# Patient Record
Sex: FEMALE | Race: BLACK OR AFRICAN AMERICAN | NOT HISPANIC OR LATINO | Employment: OTHER | URBAN - METROPOLITAN AREA
[De-identification: names, ages, dates, MRNs, and addresses within clinical notes are randomized per-mention and may not be internally consistent; named-entity substitution may affect disease eponyms.]

---

## 2020-05-01 ENCOUNTER — HOSPITAL ENCOUNTER (EMERGENCY)
Facility: HOSPITAL | Age: 75
Discharge: HOME | End: 2020-05-02
Attending: FAMILY MEDICINE
Payer: MEDICARE

## 2020-05-01 DIAGNOSIS — N30.00 ACUTE CYSTITIS WITHOUT HEMATURIA: Primary | ICD-10-CM

## 2020-05-01 DIAGNOSIS — R10.9 FLANK PAIN: ICD-10-CM

## 2020-05-01 DIAGNOSIS — K59.00 CONSTIPATION, UNSPECIFIED CONSTIPATION TYPE: ICD-10-CM

## 2020-05-01 DIAGNOSIS — K52.9 ENTERITIS: ICD-10-CM

## 2020-05-01 DIAGNOSIS — R93.2 ABNORMAL FINDINGS ON DIAGNOSTIC IMAGING OF GALL BLADDER: ICD-10-CM

## 2020-05-01 LAB
ALBUMIN SERPL BCP-MCNC: 3.7 G/DL (ref 3.5–5.2)
ALP SERPL-CCNC: 84 U/L (ref 55–135)
ALT SERPL W/O P-5'-P-CCNC: 15 U/L (ref 10–44)
ANION GAP SERPL CALC-SCNC: 5 MMOL/L (ref 8–16)
AST SERPL-CCNC: 18 U/L (ref 10–40)
BASOPHILS # BLD AUTO: 0.01 K/UL (ref 0–0.2)
BASOPHILS NFR BLD: 0.2 % (ref 0–1.9)
BILIRUB SERPL-MCNC: 0.2 MG/DL (ref 0.1–1)
BUN SERPL-MCNC: 10 MG/DL (ref 8–23)
CALCIUM SERPL-MCNC: 9 MG/DL (ref 8.7–10.5)
CHLORIDE SERPL-SCNC: 106 MMOL/L (ref 95–110)
CO2 SERPL-SCNC: 31 MMOL/L (ref 23–29)
CREAT SERPL-MCNC: 0.9 MG/DL (ref 0.5–1.4)
DIFFERENTIAL METHOD: ABNORMAL
EOSINOPHIL # BLD AUTO: 0 K/UL (ref 0–0.5)
EOSINOPHIL NFR BLD: 0.9 % (ref 0–8)
ERYTHROCYTE [DISTWIDTH] IN BLOOD BY AUTOMATED COUNT: 13.6 % (ref 11.5–14.5)
EST. GFR  (AFRICAN AMERICAN): >60 ML/MIN/1.73 M^2
EST. GFR  (NON AFRICAN AMERICAN): >60 ML/MIN/1.73 M^2
GLUCOSE SERPL-MCNC: 87 MG/DL (ref 70–110)
HCT VFR BLD AUTO: 36 % (ref 37–48.5)
HGB BLD-MCNC: 11.3 G/DL (ref 12–16)
IMM GRANULOCYTES # BLD AUTO: 0.01 K/UL (ref 0–0.04)
IMM GRANULOCYTES NFR BLD AUTO: 0.2 % (ref 0–0.5)
LIPASE SERPL-CCNC: 464 U/L (ref 4–60)
LYMPHOCYTES # BLD AUTO: 1.7 K/UL (ref 1–4.8)
LYMPHOCYTES NFR BLD: 37.4 % (ref 18–48)
MCH RBC QN AUTO: 29 PG (ref 27–31)
MCHC RBC AUTO-ENTMCNC: 31.4 G/DL (ref 32–36)
MCV RBC AUTO: 92 FL (ref 82–98)
MONOCYTES # BLD AUTO: 0.5 K/UL (ref 0.3–1)
MONOCYTES NFR BLD: 11 % (ref 4–15)
NEUTROPHILS # BLD AUTO: 2.2 K/UL (ref 1.8–7.7)
NEUTROPHILS NFR BLD: 50.3 % (ref 38–73)
NRBC BLD-RTO: 0 /100 WBC
PLATELET # BLD AUTO: 183 K/UL (ref 150–350)
PMV BLD AUTO: 11.2 FL (ref 9.2–12.9)
POTASSIUM SERPL-SCNC: 3.5 MMOL/L (ref 3.5–5.1)
PROT SERPL-MCNC: 7.3 G/DL (ref 6–8.4)
RBC # BLD AUTO: 3.9 M/UL (ref 4–5.4)
SODIUM SERPL-SCNC: 142 MMOL/L (ref 136–145)
WBC # BLD AUTO: 4.46 K/UL (ref 3.9–12.7)

## 2020-05-01 PROCEDURE — 99285 EMERGENCY DEPT VISIT HI MDM: CPT | Mod: 25,ER

## 2020-05-01 PROCEDURE — 96374 THER/PROPH/DIAG INJ IV PUSH: CPT | Mod: ER

## 2020-05-01 PROCEDURE — 63600175 PHARM REV CODE 636 W HCPCS: Mod: ER | Performed by: FAMILY MEDICINE

## 2020-05-01 PROCEDURE — 25000003 PHARM REV CODE 250: Mod: ER | Performed by: FAMILY MEDICINE

## 2020-05-01 PROCEDURE — 96375 TX/PRO/DX INJ NEW DRUG ADDON: CPT | Mod: ER

## 2020-05-01 PROCEDURE — 96361 HYDRATE IV INFUSION ADD-ON: CPT | Mod: ER

## 2020-05-01 PROCEDURE — 83690 ASSAY OF LIPASE: CPT | Mod: ER

## 2020-05-01 PROCEDURE — 80053 COMPREHEN METABOLIC PANEL: CPT | Mod: ER

## 2020-05-01 PROCEDURE — 85025 COMPLETE CBC W/AUTO DIFF WBC: CPT | Mod: ER

## 2020-05-01 RX ORDER — MORPHINE SULFATE 4 MG/ML
4 INJECTION, SOLUTION INTRAMUSCULAR; INTRAVENOUS
Status: COMPLETED | OUTPATIENT
Start: 2020-05-01 | End: 2020-05-01

## 2020-05-01 RX ORDER — KETOROLAC TROMETHAMINE 30 MG/ML
15 INJECTION, SOLUTION INTRAMUSCULAR; INTRAVENOUS
Status: DISCONTINUED | OUTPATIENT
Start: 2020-05-01 | End: 2020-05-01

## 2020-05-01 RX ADMIN — SODIUM CHLORIDE 1000 ML: 0.9 INJECTION, SOLUTION INTRAVENOUS at 10:05

## 2020-05-01 RX ADMIN — MORPHINE SULFATE 4 MG: 4 INJECTION INTRAVENOUS at 10:05

## 2020-05-02 VITALS
SYSTOLIC BLOOD PRESSURE: 148 MMHG | DIASTOLIC BLOOD PRESSURE: 68 MMHG | OXYGEN SATURATION: 100 % | WEIGHT: 156.5 LBS | RESPIRATION RATE: 20 BRPM | HEART RATE: 51 BPM | TEMPERATURE: 98 F

## 2020-05-02 LAB
BACTERIA #/AREA URNS AUTO: ABNORMAL /HPF
BILIRUB UR QL STRIP: NEGATIVE
CLARITY UR REFRACT.AUTO: CLEAR
COLOR UR AUTO: YELLOW
GLUCOSE UR QL STRIP: NEGATIVE
HGB UR QL STRIP: NEGATIVE
KETONES UR QL STRIP: NEGATIVE
LEUKOCYTE ESTERASE UR QL STRIP: NEGATIVE
MICROSCOPIC COMMENT: ABNORMAL
NITRITE UR QL STRIP: POSITIVE
PH UR STRIP: 7 [PH] (ref 5–8)
PROT UR QL STRIP: NEGATIVE
RBC #/AREA URNS AUTO: 1 /HPF (ref 0–4)
SP GR UR STRIP: 1.01 (ref 1–1.03)
SQUAMOUS #/AREA URNS AUTO: 1 /HPF
URN SPEC COLLECT METH UR: ABNORMAL
UROBILINOGEN UR STRIP-ACNC: NEGATIVE EU/DL
WBC #/AREA URNS AUTO: 2 /HPF (ref 0–5)

## 2020-05-02 PROCEDURE — 81000 URINALYSIS NONAUTO W/SCOPE: CPT | Mod: ER

## 2020-05-02 PROCEDURE — 63600175 PHARM REV CODE 636 W HCPCS: Mod: ER | Performed by: FAMILY MEDICINE

## 2020-05-02 RX ORDER — CEPHALEXIN 500 MG/1
500 CAPSULE ORAL EVERY 12 HOURS
Qty: 10 CAPSULE | Refills: 0 | Status: SHIPPED | OUTPATIENT
Start: 2020-05-02 | End: 2020-05-07

## 2020-05-02 RX ADMIN — CEFTRIAXONE 1 G: 1 INJECTION, SOLUTION INTRAVENOUS at 12:05

## 2020-05-02 NOTE — DISCHARGE INSTRUCTIONS
Please follow up with her primary care physician.  I recommend that you have further follow-up/imaging in regards to your gallbladder.  You may need a biopsy to rule out cancer.  Take Tylenol or ibuprofen for pain.  Drink lots of fluids. Take antibiotics as prescribed and until completion.     For URINARY TRACT INFECTION, I instructed patient to avoid holding in urine and recommended that she urinate when she feels the urge.  Also advised patient to drink plenty of liquids and reminded patient that she may need to drink more fluids than usual to help flush out the bacteria. Instructed patient to avoid alcohol, caffeine, and citrus juices as these substances can irritate your bladder and increase your symptoms.  Also recommended that patient apply heat to lower abdomen for 20 to 30 minutes every two hours to help decrease discomfort and pressure in the bladder.      Regarding CONSTIPATION, I informed patient of common causes of constipation (low-fiber diet, lack of physical activity, decreased fluid intake, and delays in going to the bathroom when urge is present) and ways to prevent it (eat lots of fiber, drink plenty of fluids daily, exercise regularly, and go to the bathroom when you urge presents.  For treatment, advised patient to use OTC medications:  stool softeners (docusate sodium), bulk laxatives (psyllium) to help add fluid and bulk to the stool, suppositories or gentle laxatives (milk of magnesia liquid), and to reserve enemas or stimulant laxatives for severe cases only. Reiterated the importance of following up with primary care provider for management of their constipation.    Discussed the signs and symptoms of gastroenteritis with patient including: abdominal pain; nausea, vomiting, and diarrhea; chills; clammy skin; excessive sweating; fever; joint stiffness; fecal incontinence; muscle pain; and weight loss. Advised patient to drink water or sports beverages such as Gatorade for electrolyte  replacement; do NOT use fruit juice (including apple juice), sodas or cola (flat or bubbly), Jell-O, or broth due to high sugar content; drink small amounts of fluid (2-4 oz.) every 30-60 minutes; and eat small amounts of food, when tolerable such as cereals, bread, potatoes, apples, bananas, and vegetables.  I also instructed on disease transmission and need for frequent hand washing.

## 2020-05-02 NOTE — ED PROVIDER NOTES
Encounter Date: 5/1/2020       History     Chief Complaint   Patient presents with    Flank Pain     left flank pain that started today.     This is a 74-year-old Afro-American female with past medical history of hypertension, hyperlipidemia, hypothyroidism who presents to the emergency department for left flank pain.  Patient states that this started a few hours prior to arrival.  Denies any trauma.  Denies any urinary symptoms including hematuria or dysuria.  States that the pain is a dull sharp sensation along the left flank.  Denies any similar previous episodes.  Reports last bowel movement was approximately 2-3 days ago which is her norm.  Denies any diarrhea.  Denies any nausea or vomiting.  Denies any fevers chills.  Denies taking anything for her pain either.  Denies any exacerbating or relieving factors.  Denies history of kidney stones in the past.  Denies any chest pain shortness of breath.        Review of patient's allergies indicates:  No Known Allergies  Past Medical History:   Diagnosis Date    Hyperlipidemia     Hypertension      History reviewed. No pertinent surgical history.  History reviewed. No pertinent family history.  Social History     Tobacco Use    Smoking status: Former Smoker   Substance Use Topics    Alcohol use: Not Currently    Drug use: Never     Review of Systems   Constitutional: Negative for chills, diaphoresis and fever.   HENT: Negative for congestion, postnasal drip, rhinorrhea, sneezing and sore throat.    Eyes: Negative for visual disturbance.   Respiratory: Negative for cough, chest tightness and shortness of breath.    Cardiovascular: Negative for chest pain, palpitations and leg swelling.   Gastrointestinal: Positive for constipation. Negative for abdominal pain, diarrhea, nausea and vomiting.   Genitourinary: Positive for flank pain. Negative for dysuria, frequency, hematuria and urgency.   Musculoskeletal: Negative for back pain, gait problem and myalgias.    Skin: Negative for rash.   Neurological: Negative for weakness, light-headedness, numbness and headaches.   Hematological: Does not bruise/bleed easily.   All other systems reviewed and are negative.      Physical Exam     Initial Vitals [05/01/20 2202]   BP Pulse Resp Temp SpO2   (!) 192/94 60 20 98.3 °F (36.8 °C) 100 %      MAP       --         Vitals:    05/01/20 2202 05/01/20 2317   BP: (!) 192/94 (!) 145/67   Pulse: 60 (!) 50   Resp: 20 20   Temp: 98.3 °F (36.8 °C)    TempSrc: Oral    SpO2: 100% 100%   Weight: 71 kg (156 lb 8.4 oz)        Physical Exam    Nursing note and vitals reviewed.  Constitutional: She appears well-developed and well-nourished. She is not diaphoretic. She appears distressed.   HENT:   Head: Normocephalic.   Right Ear: External ear normal.   Left Ear: External ear normal.   Mouth/Throat: Oropharynx is clear and moist.   Eyes: Conjunctivae and EOM are normal. Pupils are equal, round, and reactive to light.   Neck: Normal range of motion. Neck supple. No JVD present.   Cardiovascular: Normal rate, regular rhythm and normal heart sounds.   Pulmonary/Chest: Breath sounds normal. No respiratory distress. She has no wheezes.   Abdominal: Soft. Bowel sounds are normal. She exhibits no distension. There is no tenderness. There is no rebound and no guarding.   Genitourinary:   Genitourinary Comments: Deferred vaginal exam  Tenderness palpation along the left flank however there is no CVA tenderness nor suprapubic   Musculoskeletal: Normal range of motion. She exhibits no edema or tenderness.   Neurological: She is alert and oriented to person, place, and time. She has normal strength. No cranial nerve deficit or sensory deficit.   Skin: Skin is warm and dry. Capillary refill takes less than 2 seconds. No rash noted.   Psychiatric: She has a normal mood and affect. Thought content normal.         ED Course   Procedures  Labs Reviewed   CBC W/ AUTO DIFFERENTIAL - Abnormal; Notable for the  following components:       Result Value    RBC 3.90 (*)     Hemoglobin 11.3 (*)     Hematocrit 36.0 (*)     Mean Corpuscular Hemoglobin Conc 31.4 (*)     All other components within normal limits   COMPREHENSIVE METABOLIC PANEL - Abnormal; Notable for the following components:    CO2 31 (*)     Anion Gap 5 (*)     All other components within normal limits   URINALYSIS, REFLEX TO URINE CULTURE - Abnormal; Notable for the following components:    Nitrite, UA Positive (*)     All other components within normal limits    Narrative:     Preferred Collection Type->Urine, Clean Catch   LIPASE - Abnormal; Notable for the following components:    Lipase 464 (*)     All other components within normal limits   URINALYSIS MICROSCOPIC - Abnormal; Notable for the following components:    Bacteria Many (*)     All other components within normal limits    Narrative:     Preferred Collection Type->Urine, Clean Catch         Results for orders placed or performed during the hospital encounter of 05/01/20   CBC auto differential   Result Value Ref Range    WBC 4.46 3.90 - 12.70 K/uL    RBC 3.90 (L) 4.00 - 5.40 M/uL    Hemoglobin 11.3 (L) 12.0 - 16.0 g/dL    Hematocrit 36.0 (L) 37.0 - 48.5 %    Mean Corpuscular Volume 92 82 - 98 fL    Mean Corpuscular Hemoglobin 29.0 27.0 - 31.0 pg    Mean Corpuscular Hemoglobin Conc 31.4 (L) 32.0 - 36.0 g/dL    RDW 13.6 11.5 - 14.5 %    Platelets 183 150 - 350 K/uL    MPV 11.2 9.2 - 12.9 fL    Immature Granulocytes 0.2 0.0 - 0.5 %    Gran # (ANC) 2.2 1.8 - 7.7 K/uL    Immature Grans (Abs) 0.01 0.00 - 0.04 K/uL    Lymph # 1.7 1.0 - 4.8 K/uL    Mono # 0.5 0.3 - 1.0 K/uL    Eos # 0.0 0.0 - 0.5 K/uL    Baso # 0.01 0.00 - 0.20 K/uL    nRBC 0 0 /100 WBC    Gran% 50.3 38.0 - 73.0 %    Lymph% 37.4 18.0 - 48.0 %    Mono% 11.0 4.0 - 15.0 %    Eosinophil% 0.9 0.0 - 8.0 %    Basophil% 0.2 0.0 - 1.9 %    Differential Method Automated    Comprehensive metabolic panel   Result Value Ref Range    Sodium 142 136 -  145 mmol/L    Potassium 3.5 3.5 - 5.1 mmol/L    Chloride 106 95 - 110 mmol/L    CO2 31 (H) 23 - 29 mmol/L    Glucose 87 70 - 110 mg/dL    BUN, Bld 10 8 - 23 mg/dL    Creatinine 0.9 0.5 - 1.4 mg/dL    Calcium 9.0 8.7 - 10.5 mg/dL    Total Protein 7.3 6.0 - 8.4 g/dL    Albumin 3.7 3.5 - 5.2 g/dL    Total Bilirubin 0.2 0.1 - 1.0 mg/dL    Alkaline Phosphatase 84 55 - 135 U/L    AST 18 10 - 40 U/L    ALT 15 10 - 44 U/L    Anion Gap 5 (L) 8 - 16 mmol/L    eGFR if African American >60.0 >60 mL/min/1.73 m^2    eGFR if non African American >60.0 >60 mL/min/1.73 m^2   Urinalysis, Reflex to Urine Culture Urine, Clean Catch   Result Value Ref Range    Specimen UA Urine, Clean Catch     Color, UA Yellow Yellow, Straw, Kindra    Appearance, UA Clear Clear    pH, UA 7.0 5.0 - 8.0    Specific Gravity, UA 1.015 1.005 - 1.030    Protein, UA Negative Negative    Glucose, UA Negative Negative    Ketones, UA Negative Negative    Bilirubin (UA) Negative Negative    Occult Blood UA Negative Negative    Nitrite, UA Positive (A) Negative    Urobilinogen, UA Negative <2.0 EU/dL    Leukocytes, UA Negative Negative   Lipase   Result Value Ref Range    Lipase 464 (H) 4 - 60 U/L   Urinalysis Microscopic   Result Value Ref Range    RBC, UA 1 0 - 4 /hpf    WBC, UA 2 0 - 5 /hpf    Bacteria Many (A) None-Occ /hpf    Squam Epithel, UA 1 /hpf    Microscopic Comment SEE COMMENT          Imaging Results          CT Renal Stone Study ABD Pelvis WO (Final result)  Result time 05/01/20 23:02:08    Final result by Reuben Mcintosh III, MD (05/01/20 23:02:08)                 Impression:      1.  At least partially calcified gallbladder wall.  This can be seen in association with gallbladder neoplasm.  Follow-up/further workup suggested.    2.  Questionable thickening of the wall of some of the small bowel loops although this is not well seen due to lack of oral contrast.  Cannot exclude an element of enteritis.  No obstruction.    3.  Otherwise as above.   No kidney stones or obstruction.    All CT scans at this facility are performed  using dose modulation techniques as appropriate to performed exam including the following:  automated exposure control; adjustment of mA and/or kV according to the patients size (this includes techniques or standardized protocols for targeted exams where dose is matched to indication/reason for exam: i.e. extremities or head);  iterative reconstruction technique.      Electronically signed by: Reuben Mcintosh MD  Date:    05/01/2020  Time:    23:02             Narrative:    EXAMINATION:  CT RENAL STONE STUDY ABD PELVIS WO    CLINICAL HISTORY:  Flank pain, stone disease suspected;    TECHNIQUE:  Axial CT images performed through the abdomen and pelvis without intravenous contrast. Multiplanar reformats were performed and interpreted.    COMPARISON:  None    FINDINGS:  The heart size is normal.  Lung bases show no acute abnormality.  There is no free intraperitoneal air.  No bowel obstruction.  Bony windows show degenerative changes but no gross acute abnormality.    The liver and spleen are unremarkable for a non contrasted scan.  Suspect a small hiatal hernia.  Gallbladder shows calcification along the wall.  Please correlate.    There is no adrenal or pancreatic mass or enlargement.  Kidneys show no solid mass or obstruction.  Moderate volume of stool noted in the colon.  Bladder is unremarkable.  No abnormality in the right lower quadrant that would suggest acute appendicitis.    There is some loops of small bowel that show mild wall thickening but this is difficult to evaluate due to the lack of oral contrast.                                                   ED Course as of May 02 0024   Fri May 01, 2020   2320 Re-evaluation:  Patient states that morphine has helped.  She is resting comfortably in bed with pain improved.    [HAILEY]   2321 Have discussed results with patient.  At this time I have discussed the incidental finding of  her gallbladder being calcified and a differential diagnosis of potential malignancy.  I recommended that she have this followed up with her primary care physician for further imaging and evaluation.  Clinically her pain is not in the right upper quadrant but in the left flank therefore I do not believe this is attributing to her clinical presentation.  Patient does not drink alcohol nor do I feel that this is pancreatitis either.  There is potential enteritis and constipation.    [HAILEY]      ED Course User Index  [HAILEY] Nora Gonzalez MD          Medications   cefTRIAXone (ROCEPHIN) 1 g/50 mL D5W IVPB (has no administration in time range)   sodium chloride 0.9% bolus 1,000 mL (1,000 mLs Intravenous New Bag 5/1/20 2258)   morphine injection 4 mg (4 mg Intravenous Given 5/1/20 2258)           Clinical Impression:       ICD-10-CM ICD-9-CM   1. Acute cystitis without hematuria N30.00 595.0   2. Flank pain R10.9 789.09   3. Abnormal findings on diagnostic imaging of gall bladder R93.2 793.3   4. Enteritis K52.9 558.9   5. Constipation, unspecified constipation type K59.00 564.00       12:22 AM RE-EVALUATION & DISPOSITION:   Reassessment at the time of disposition demonstrates that the patient is resting comfortably in no acute distress.  She has remained hemodynamically stable throughout the entire ED visit and is without objective evidence for acute process requiring urgent intervention or hospitalization. I discussed test results and provided counseling to patient with regard to condition, the treatment plan, specific conditions for return, and the importance of follow up.  Answered questions at this time. The patient is stable for discharge.     Please follow up with her primary care physician.  I recommend that you have further follow-up/imaging in regards to your gallbladder.  You may need a biopsy to rule out cancer.  Take Tylenol or ibuprofen for pain.  Drink lots of fluids. Take antibiotics as prescribed and until  completion.     For URINARY TRACT INFECTION, I instructed patient to avoid holding in urine and recommended that she urinate when she feels the urge.  Also advised patient to drink plenty of liquids and reminded patient that she may need to drink more fluids than usual to help flush out the bacteria. Instructed patient to avoid alcohol, caffeine, and citrus juices as these substances can irritate your bladder and increase your symptoms.  Also recommended that patient apply heat to lower abdomen for 20 to 30 minutes every two hours to help decrease discomfort and pressure in the bladder.      Regarding CONSTIPATION, I informed patient of common causes of constipation (low-fiber diet, lack of physical activity, decreased fluid intake, and delays in going to the bathroom when urge is present) and ways to prevent it (eat lots of fiber, drink plenty of fluids daily, exercise regularly, and go to the bathroom when you urge presents.  For treatment, advised patient to use OTC medications:  stool softeners (docusate sodium), bulk laxatives (psyllium) to help add fluid and bulk to the stool, suppositories or gentle laxatives (milk of magnesia liquid), and to reserve enemas or stimulant laxatives for severe cases only. Reiterated the importance of following up with primary care provider for management of their constipation.    Discussed the signs and symptoms of gastroenteritis with patient including: abdominal pain; nausea, vomiting, and diarrhea; chills; clammy skin; excessive sweating; fever; joint stiffness; fecal incontinence; muscle pain; and weight loss. Advised patient to drink water or sports beverages such as Gatorade for electrolyte replacement; do NOT use fruit juice (including apple juice), sodas or cola (flat or bubbly), Jell-O, or broth due to high sugar content; drink small amounts of fluid (2-4 oz.) every 30-60 minutes; and eat small amounts of food, when tolerable such as cereals, bread, potatoes, apples,  "bananas, and vegetables.  I also instructed on disease transmission and need for frequent hand washing.            ED Disposition Condition    Discharge Stable        ED Prescriptions     Medication Sig Dispense Start Date End Date Auth. Provider    cephALEXin (KEFLEX) 500 MG capsule Take 1 capsule (500 mg total) by mouth every 12 (twelve) hours. for 5 days 10 capsule 5/2/2020 5/7/2020 Nora Gonzalez MD        Follow-up Information     Follow up With Specialties Details Why Contact Info    Willem Rodrigues MD Family Medicine Schedule an appointment as soon as possible for a visit in 3 days  [address] [facility] [address] [phone]                        Portions of this note may have been created with voice recognition software. Occasional "wrong-word" or "sound-a-like" substitutions may have occurred due to the inherent limitations of voice recognition software. Please, read the note carefully and recognize, using context, where substitutions have occurred.                    Nora Gonzalez MD  05/02/20 0024    "

## 2020-05-04 ENCOUNTER — HOSPITAL ENCOUNTER (EMERGENCY)
Facility: HOSPITAL | Age: 75
Discharge: HOME | End: 2020-05-04
Attending: EMERGENCY MEDICINE
Payer: MEDICARE

## 2020-05-04 VITALS
OXYGEN SATURATION: 99 % | WEIGHT: 153.31 LBS | RESPIRATION RATE: 20 BRPM | HEART RATE: 66 BPM | DIASTOLIC BLOOD PRESSURE: 69 MMHG | TEMPERATURE: 99 F | SYSTOLIC BLOOD PRESSURE: 146 MMHG

## 2020-05-04 DIAGNOSIS — R11.2 NAUSEA & VOMITING: Primary | ICD-10-CM

## 2020-05-04 DIAGNOSIS — R53.1 WEAKNESS: ICD-10-CM

## 2020-05-04 LAB
ALBUMIN SERPL BCP-MCNC: 3.9 G/DL (ref 3.5–5.2)
ALP SERPL-CCNC: 67 U/L (ref 55–135)
ALT SERPL W/O P-5'-P-CCNC: 13 U/L (ref 10–44)
ANION GAP SERPL CALC-SCNC: 10 MMOL/L (ref 8–16)
AST SERPL-CCNC: 21 U/L (ref 10–40)
BASOPHILS # BLD AUTO: 0.01 K/UL (ref 0–0.2)
BASOPHILS NFR BLD: 0.2 % (ref 0–1.9)
BILIRUB SERPL-MCNC: 0.4 MG/DL (ref 0.1–1)
BNP SERPL-MCNC: 233 PG/ML (ref 0–99)
BUN SERPL-MCNC: 8 MG/DL (ref 8–23)
CALCIUM SERPL-MCNC: 9.4 MG/DL (ref 8.7–10.5)
CHLORIDE SERPL-SCNC: 102 MMOL/L (ref 95–110)
CK SERPL-CCNC: 33 U/L (ref 20–180)
CO2 SERPL-SCNC: 27 MMOL/L (ref 23–29)
CREAT SERPL-MCNC: 0.9 MG/DL (ref 0.5–1.4)
CRP SERPL-MCNC: 3.2 MG/L (ref 0–8.2)
DIFFERENTIAL METHOD: ABNORMAL
EOSINOPHIL # BLD AUTO: 0 K/UL (ref 0–0.5)
EOSINOPHIL NFR BLD: 0.2 % (ref 0–8)
ERYTHROCYTE [DISTWIDTH] IN BLOOD BY AUTOMATED COUNT: 13.5 % (ref 11.5–14.5)
EST. GFR  (AFRICAN AMERICAN): >60 ML/MIN/1.73 M^2
EST. GFR  (NON AFRICAN AMERICAN): >60 ML/MIN/1.73 M^2
FERRITIN SERPL-MCNC: 315 NG/ML (ref 20–300)
GLUCOSE SERPL-MCNC: 112 MG/DL (ref 70–110)
HCT VFR BLD AUTO: 34.8 % (ref 37–48.5)
HCV AB SERPL QL IA: NEGATIVE
HGB BLD-MCNC: 10.9 G/DL (ref 12–16)
IMM GRANULOCYTES # BLD AUTO: 0.02 K/UL (ref 0–0.04)
IMM GRANULOCYTES NFR BLD AUTO: 0.4 % (ref 0–0.5)
LACTATE SERPL-SCNC: 1.3 MMOL/L (ref 0.5–2.2)
LDH SERPL L TO P-CCNC: 177 U/L (ref 110–260)
LYMPHOCYTES # BLD AUTO: 0.6 K/UL (ref 1–4.8)
LYMPHOCYTES NFR BLD: 10.2 % (ref 18–48)
MCH RBC QN AUTO: 28.5 PG (ref 27–31)
MCHC RBC AUTO-ENTMCNC: 31.3 G/DL (ref 32–36)
MCV RBC AUTO: 91 FL (ref 82–98)
MONOCYTES # BLD AUTO: 0.4 K/UL (ref 0.3–1)
MONOCYTES NFR BLD: 6.8 % (ref 4–15)
NEUTROPHILS # BLD AUTO: 4.5 K/UL (ref 1.8–7.7)
NEUTROPHILS NFR BLD: 82.2 % (ref 38–73)
NRBC BLD-RTO: 0 /100 WBC
PLATELET # BLD AUTO: 152 K/UL (ref 150–350)
PMV BLD AUTO: 11.2 FL (ref 9.2–12.9)
POTASSIUM SERPL-SCNC: 3.5 MMOL/L (ref 3.5–5.1)
PROCALCITONIN SERPL IA-MCNC: 0.03 NG/ML
PROT SERPL-MCNC: 7.2 G/DL (ref 6–8.4)
RBC # BLD AUTO: 3.83 M/UL (ref 4–5.4)
SARS-COV-2 RDRP RESP QL NAA+PROBE: NEGATIVE
SODIUM SERPL-SCNC: 139 MMOL/L (ref 136–145)
TROPONIN I SERPL DL<=0.01 NG/ML-MCNC: <0.006 NG/ML (ref 0–0.03)
WBC # BLD AUTO: 5.47 K/UL (ref 3.9–12.7)

## 2020-05-04 PROCEDURE — 85025 COMPLETE CBC W/AUTO DIFF WBC: CPT | Mod: ER

## 2020-05-04 PROCEDURE — U0002 COVID-19 LAB TEST NON-CDC: HCPCS | Mod: ER

## 2020-05-04 PROCEDURE — 96374 THER/PROPH/DIAG INJ IV PUSH: CPT | Mod: ER

## 2020-05-04 PROCEDURE — 84145 PROCALCITONIN (PCT): CPT | Mod: ER

## 2020-05-04 PROCEDURE — 82728 ASSAY OF FERRITIN: CPT

## 2020-05-04 PROCEDURE — 82550 ASSAY OF CK (CPK): CPT | Mod: ER

## 2020-05-04 PROCEDURE — 93005 ELECTROCARDIOGRAM TRACING: CPT | Mod: ER

## 2020-05-04 PROCEDURE — 84484 ASSAY OF TROPONIN QUANT: CPT | Mod: ER

## 2020-05-04 PROCEDURE — 99284 EMERGENCY DEPT VISIT MOD MDM: CPT | Mod: 25,ER

## 2020-05-04 PROCEDURE — 83880 ASSAY OF NATRIURETIC PEPTIDE: CPT | Mod: ER

## 2020-05-04 PROCEDURE — 93010 EKG 12-LEAD: ICD-10-PCS | Mod: ,,, | Performed by: INTERNAL MEDICINE

## 2020-05-04 PROCEDURE — 86140 C-REACTIVE PROTEIN: CPT | Mod: ER

## 2020-05-04 PROCEDURE — 83605 ASSAY OF LACTIC ACID: CPT | Mod: ER

## 2020-05-04 PROCEDURE — 25000003 PHARM REV CODE 250: Mod: ER | Performed by: EMERGENCY MEDICINE

## 2020-05-04 PROCEDURE — 80053 COMPREHEN METABOLIC PANEL: CPT | Mod: ER

## 2020-05-04 PROCEDURE — 83615 LACTATE (LD) (LDH) ENZYME: CPT | Mod: ER

## 2020-05-04 PROCEDURE — 86803 HEPATITIS C AB TEST: CPT

## 2020-05-04 PROCEDURE — 93010 ELECTROCARDIOGRAM REPORT: CPT | Mod: ,,, | Performed by: INTERNAL MEDICINE

## 2020-05-04 PROCEDURE — 63600175 PHARM REV CODE 636 W HCPCS: Mod: ER | Performed by: EMERGENCY MEDICINE

## 2020-05-04 RX ORDER — ONDANSETRON 4 MG/1
4 TABLET, FILM COATED ORAL EVERY 8 HOURS PRN
Qty: 12 TABLET | Refills: 0 | Status: SHIPPED | OUTPATIENT
Start: 2020-05-04 | End: 2020-05-25

## 2020-05-04 RX ORDER — ONDANSETRON 2 MG/ML
4 INJECTION INTRAMUSCULAR; INTRAVENOUS
Status: COMPLETED | OUTPATIENT
Start: 2020-05-04 | End: 2020-05-04

## 2020-05-04 RX ADMIN — ONDANSETRON 4 MG: 2 INJECTION INTRAMUSCULAR; INTRAVENOUS at 10:05

## 2020-05-04 RX ADMIN — SODIUM CHLORIDE 500 ML: 0.9 INJECTION, SOLUTION INTRAVENOUS at 10:05

## 2020-05-04 NOTE — ED PROVIDER NOTES
Encounter Date: 5/4/2020       History     Chief Complaint   Patient presents with    Emesis     vomiting,dizziness and weakness     The history is provided by the patient.   Emesis    This is a new problem. The current episode started yesterday. The problem occurs every few hours. The problem has been unchanged. The emesis has an appearance of stomach contents. Pertinent negatives include no abdominal pain, no arthralgias, no chills, no cough, no diarrhea, no fever, no headaches, no myalgias, no sweats and no URI.     Review of patient's allergies indicates:  No Known Allergies  Past Medical History:   Diagnosis Date    Hyperlipidemia     Hypertension      No past surgical history on file.  No family history on file.  Social History     Tobacco Use    Smoking status: Former Smoker   Substance Use Topics    Alcohol use: Not Currently    Drug use: Never     Review of Systems   Constitutional: Negative for chills and fever.   HENT: Negative for congestion.    Respiratory: Negative for cough.    Cardiovascular: Negative for chest pain.   Gastrointestinal: Positive for vomiting. Negative for abdominal pain and diarrhea.   Musculoskeletal: Negative for arthralgias and myalgias.   Neurological: Positive for dizziness and weakness. Negative for headaches.   All other systems reviewed and are negative.      Physical Exam     Initial Vitals [05/04/20 0937]   BP Pulse Resp Temp SpO2   (!) 184/86 71 18 98.5 °F (36.9 °C) 97 %      MAP       --         Physical Exam    Nursing note and vitals reviewed.  Constitutional: She appears well-developed and well-nourished. No distress.   Elderly frail   HENT:   Head: Normocephalic and atraumatic.   Mouth/Throat: Oropharynx is clear and moist.   Eyes: Conjunctivae and EOM are normal. Pupils are equal, round, and reactive to light.   Neck: Normal range of motion. Neck supple.   Cardiovascular: Normal rate, regular rhythm and normal heart sounds.   Pulmonary/Chest: Breath sounds  normal. No respiratory distress.   Abdominal: Soft. Bowel sounds are normal. She exhibits no distension. There is no tenderness.   Musculoskeletal: Normal range of motion.   Neurological: She is alert and oriented to person, place, and time. She has normal strength.   Skin: Skin is warm and dry.   Psychiatric: She has a normal mood and affect. Thought content normal.         ED Course   Procedures  Labs Reviewed   CBC W/ AUTO DIFFERENTIAL - Abnormal; Notable for the following components:       Result Value    RBC 3.83 (*)     Hemoglobin 10.9 (*)     Hematocrit 34.8 (*)     Mean Corpuscular Hemoglobin Conc 31.3 (*)     Lymph # 0.6 (*)     Gran% 82.2 (*)     Lymph% 10.2 (*)     All other components within normal limits   COMPREHENSIVE METABOLIC PANEL - Abnormal; Notable for the following components:    Glucose 112 (*)     All other components within normal limits   B-TYPE NATRIURETIC PEPTIDE - Abnormal; Notable for the following components:     (*)     All other components within normal limits   C-REACTIVE PROTEIN   LACTATE DEHYDROGENASE   CK   LACTIC ACID, PLASMA   TROPONIN I   SARS-COV-2 RNA AMPLIFICATION, QUAL    Narrative:     What symptom criteria does the patient meet?->Muscle pain   PROCALCITONIN   HEPATITIS C ANTIBODY   FERRITIN     Results for orders placed or performed during the hospital encounter of 05/04/20   CBC auto differential   Result Value Ref Range    WBC 5.47 3.90 - 12.70 K/uL    RBC 3.83 (L) 4.00 - 5.40 M/uL    Hemoglobin 10.9 (L) 12.0 - 16.0 g/dL    Hematocrit 34.8 (L) 37.0 - 48.5 %    Mean Corpuscular Volume 91 82 - 98 fL    Mean Corpuscular Hemoglobin 28.5 27.0 - 31.0 pg    Mean Corpuscular Hemoglobin Conc 31.3 (L) 32.0 - 36.0 g/dL    RDW 13.5 11.5 - 14.5 %    Platelets 152 150 - 350 K/uL    MPV 11.2 9.2 - 12.9 fL    Immature Granulocytes 0.4 0.0 - 0.5 %    Gran # (ANC) 4.5 1.8 - 7.7 K/uL    Immature Grans (Abs) 0.02 0.00 - 0.04 K/uL    Lymph # 0.6 (L) 1.0 - 4.8 K/uL    Mono # 0.4 0.3 -  1.0 K/uL    Eos # 0.0 0.0 - 0.5 K/uL    Baso # 0.01 0.00 - 0.20 K/uL    nRBC 0 0 /100 WBC    Gran% 82.2 (H) 38.0 - 73.0 %    Lymph% 10.2 (L) 18.0 - 48.0 %    Mono% 6.8 4.0 - 15.0 %    Eosinophil% 0.2 0.0 - 8.0 %    Basophil% 0.2 0.0 - 1.9 %    Differential Method Automated    Comprehensive metabolic panel   Result Value Ref Range    Sodium 139 136 - 145 mmol/L    Potassium 3.5 3.5 - 5.1 mmol/L    Chloride 102 95 - 110 mmol/L    CO2 27 23 - 29 mmol/L    Glucose 112 (H) 70 - 110 mg/dL    BUN, Bld 8 8 - 23 mg/dL    Creatinine 0.9 0.5 - 1.4 mg/dL    Calcium 9.4 8.7 - 10.5 mg/dL    Total Protein 7.2 6.0 - 8.4 g/dL    Albumin 3.9 3.5 - 5.2 g/dL    Total Bilirubin 0.4 0.1 - 1.0 mg/dL    Alkaline Phosphatase 67 55 - 135 U/L    AST 21 10 - 40 U/L    ALT 13 10 - 44 U/L    Anion Gap 10 8 - 16 mmol/L    eGFR if African American >60.0 >60 mL/min/1.73 m^2    eGFR if non African American >60.0 >60 mL/min/1.73 m^2   C-Reactive Protein   Result Value Ref Range    CRP 3.2 0.0 - 8.2 mg/L   Lactate Dehydrogenase   Result Value Ref Range     110 - 260 U/L   CK   Result Value Ref Range    CPK 33 20 - 180 U/L   Lactic Acid, Plasma   Result Value Ref Range    Lactate (Lactic Acid) 1.3 0.5 - 2.2 mmol/L   Troponin I   Result Value Ref Range    Troponin I <0.006 0.000 - 0.026 ng/mL   COVID-19 Rapid Screening   Result Value Ref Range    SARS-CoV-2 RNA, Amplification, Qual Negative Negative   Procalcitonin   Result Value Ref Range    Procalcitonin 0.03 <0.25 ng/mL   Brain Natriuretic Peptide   Result Value Ref Range     (H) 0 - 99 pg/mL          ECG Results          EKG 12-lead (In process)  Result time 05/04/20 10:03:30    In process by Interface, Lab In [facility] (05/04/20 10:03:30)                 Narrative:    Test Reason : R53.1,    Vent. Rate : 053 BPM     Atrial Rate : 053 BPM     P-R Int : 154 ms          QRS Dur : 082 ms      QT Int : 462 ms       P-R-T Axes : 047 025 083 degrees     QTc Int : 433 ms    Sinus  bradycardia  Anterior infarct ,age undetermined  T wave abnormality, consider lateral ischemia  Abnormal ECG  No previous ECGs available    Referred By:  DARIO           Confirmed By:                             Imaging Results          US Abdomen Limited (Final result)  Result time 05/04/20 11:38:22    Final result by Guillermo Sun MD (05/04/20 11:38:22)                 Impression:      Diffuse shadowing of the gallbladder fossa concerning for calcification of the gallbladder wall as seen on recent CT versus numerous gallstones.  No sonographic evidence of acute cholecystitis.    Fluid collection in the splenic hilum, most likely reflects adjacent distended, fluid-filled stomach.  If there is concern for left upper quadrant pathology, CT scan could be obtained for further evaluation.      Electronically signed by: Guillermo Sun  Date:    05/04/2020  Time:    11:38             Narrative:    EXAMINATION:  US ABDOMEN LIMITED    CLINICAL HISTORY:  Vomiting;    TECHNIQUE:  Limited ultrasound of the right upper quadrant of the abdomen focused on the biliary system was performed.    COMPARISON:  CT renal stone 05/01/2020    FINDINGS:  Gallbladder: Demonstrates diffuse shadowing of the gallbladder fossa possibly reflecting numerous shadowing gallstones or calcification of the gallbladder wall is seen on recent CT.  Sonographic Bernal's sign is negative.    Biliary system: The common duct is not dilated, measuring 4 mm.  No intrahepatic ductal dilatation.    Pancreas: The visualized portions of pancreas appear normal.    Liver: Measures 13.9 cm.  No focal hepatic abnormality.  The main portal vein is patent.    Spleen: Spleen is partially visualized and measures up to 8.9 cm, nonenlarged.  There is a hypoechoic fluid collection adjacent to the splenic hilum, likely reflecting the fluid-filled stomach.    Miscellaneous: No ascites.                               X-Ray Abdomen Flat And Erect (Final result)  Result time  05/04/20 11:00:24    Final result by Guillermo Sun MD (05/04/20 11:00:24)                 Impression:      No acute radiographic abnormality in the abdomen.    Partially calcified gallbladder wall as seen on recent CT.      Electronically signed by: Guillermo Sun  Date:    05/04/2020  Time:    11:00             Narrative:    EXAMINATION:  XR ABDOMEN FLAT AND ERECT    CLINICAL HISTORY:  Nausea with vomiting, unspecified    TECHNIQUE:  Flat and erect AP views of the abdomen were performed.    COMPARISON:  CT renal stone 05/01/2020    FINDINGS:  Cardiac leads project over the abdomen.  Air-filled prominence of the stomach.  No dilated loops of small bowel or colon.  No radiographic evidence of obstruction.  Right upper quadrant calcification compatible with calcified gallbladder wall as seen on recent CT.  Visualized osseous structures appear intact.  No pathologic mass in the abdomen.  No renal calcification.                               X-Ray Chest AP Portable (Final result)  Result time 05/04/20 10:56:20    Final result by Jero Beauchamp MD (05/04/20 10:56:20)                 Impression:      No acute findings.      Electronically signed by: Jero Beauchamp MD  Date:    05/04/2020  Time:    10:56             Narrative:    EXAMINATION:  XR CHEST AP PORTABLE    CLINICAL HISTORY:  Suspected Covid-19 Virus Infection;    TECHNIQUE:  Single frontal view of the chest was performed.    COMPARISON:  None    FINDINGS:  The cardiomediastinal silhouette is normal.  Mild aortic atherosclerosis and tortuosity.    The lungs are clear.  No pleural effusions.    Bones are unremarkable.                            11:52 AM - Counseling: Spoke with the patient and discussed todays findings, in addition to providing specific details for the plan of care and counseling regarding the diagnosis and prognosis. Questions are answered at this time.     I discussed with patient and/or family/caretaker that evaluation in the ED does not  suggest any emergent or life threatening medical conditions requiring immediate intervention beyond what was provided in the ED, and I believe patient is safe for discharge.  Regardless, an unremarkable evaluation in the ED does not preclude the development or presence of a serious of life threatening condition. As such, patient was instructed to return immediately for any worsening or change in current symptoms.                                       Clinical Impression:       ICD-10-CM ICD-9-CM   1. Nausea & vomiting R11.2 787.01   2. Weakness R53.1 780.79         Disposition:   Disposition: Discharged  Condition: Stable     ED Disposition Condition    Discharge Stable        ED Prescriptions     Medication Sig Dispense Start Date End Date Auth. Provider    ondansetron (ZOFRAN) 4 MG tablet Take 1 tablet (4 mg total) by mouth every 8 (eight) hours as needed. 12 tablet 5/4/2020  Amadou Leary MD        Follow-up Information     Follow up With Specialties Details Why Contact Info    Willem Rodrigues MD Family Medicine Call in 1 day  [address]  [facility] [id number] [phone]      [facility] Emergency Medicine  If symptoms worsen [id number] [address] [id number] [phone]                                     Amadou Leary MD  05/04/20 [id number]

## 2020-05-09 PROBLEM — R93.2 ABNORMAL CT SCAN, GALLBLADDER: Status: ACTIVE | Noted: 2020-05-09

## 2020-05-09 PROBLEM — R73.09 ABNORMAL GLUCOSE: Status: ACTIVE | Noted: 2020-05-09

## 2020-05-09 PROBLEM — D64.9 ANEMIA: Status: ACTIVE | Noted: 2020-05-09

## 2020-05-09 PROBLEM — F03.918 DEMENTIA WITH BEHAVIORAL DISTURBANCE: Status: ACTIVE | Noted: 2020-05-09

## 2020-05-09 PROBLEM — E78.5 HYPERLIPIDEMIA: Status: ACTIVE | Noted: 2020-05-09

## 2020-05-19 ENCOUNTER — OFFICE VISIT (OUTPATIENT)
Dept: SURGERY | Facility: CLINIC | Age: 75
End: 2020-05-19
Payer: MEDICARE

## 2020-05-19 VITALS
SYSTOLIC BLOOD PRESSURE: 137 MMHG | HEIGHT: 66 IN | WEIGHT: 156.94 LBS | BODY MASS INDEX: 25.22 KG/M2 | TEMPERATURE: 99 F | DIASTOLIC BLOOD PRESSURE: 83 MMHG | HEART RATE: 66 BPM

## 2020-05-19 DIAGNOSIS — R74.8 ELEVATED LIPASE: ICD-10-CM

## 2020-05-19 DIAGNOSIS — R93.2 ABNORMAL CT SCAN, GALLBLADDER: ICD-10-CM

## 2020-05-19 DIAGNOSIS — R10.9 ABDOMINAL PAIN, UNSPECIFIED ABDOMINAL LOCATION: ICD-10-CM

## 2020-05-19 PROCEDURE — 1126F AMNT PAIN NOTED NONE PRSNT: CPT | Mod: S$GLB,,, | Performed by: SURGERY

## 2020-05-19 PROCEDURE — 99999 PR PBB SHADOW E&M-EST. PATIENT-LVL III: ICD-10-PCS | Mod: PBBFAC,,, | Performed by: SURGERY

## 2020-05-19 PROCEDURE — 1159F MED LIST DOCD IN RCRD: CPT | Mod: S$GLB,,, | Performed by: SURGERY

## 2020-05-19 PROCEDURE — 1101F PT FALLS ASSESS-DOCD LE1/YR: CPT | Mod: CPTII,S$GLB,, | Performed by: SURGERY

## 2020-05-19 PROCEDURE — 3075F SYST BP GE 130 - 139MM HG: CPT | Mod: CPTII,S$GLB,, | Performed by: SURGERY

## 2020-05-19 PROCEDURE — 3079F DIAST BP 80-89 MM HG: CPT | Mod: CPTII,S$GLB,, | Performed by: SURGERY

## 2020-05-19 PROCEDURE — 1101F PR PT FALLS ASSESS DOC 0-1 FALLS W/OUT INJ PAST YR: ICD-10-PCS | Mod: CPTII,S$GLB,, | Performed by: SURGERY

## 2020-05-19 PROCEDURE — 99999 PR PBB SHADOW E&M-EST. PATIENT-LVL III: CPT | Mod: PBBFAC,,, | Performed by: SURGERY

## 2020-05-19 PROCEDURE — 3079F PR MOST RECENT DIASTOLIC BLOOD PRESSURE 80-89 MM HG: ICD-10-PCS | Mod: CPTII,S$GLB,, | Performed by: SURGERY

## 2020-05-19 PROCEDURE — 1126F PR PAIN SEVERITY QUANTIFIED, NO PAIN PRESENT: ICD-10-PCS | Mod: S$GLB,,, | Performed by: SURGERY

## 2020-05-19 PROCEDURE — 3075F PR MOST RECENT SYSTOLIC BLOOD PRESS GE 130-139MM HG: ICD-10-PCS | Mod: CPTII,S$GLB,, | Performed by: SURGERY

## 2020-05-19 PROCEDURE — 99203 PR OFFICE/OUTPT VISIT, NEW, LEVL III, 30-44 MIN: ICD-10-PCS | Mod: S$GLB,,, | Performed by: SURGERY

## 2020-05-19 PROCEDURE — 1159F PR MEDICATION LIST DOCUMENTED IN MEDICAL RECORD: ICD-10-PCS | Mod: S$GLB,,, | Performed by: SURGERY

## 2020-05-19 PROCEDURE — 99203 OFFICE O/P NEW LOW 30 MIN: CPT | Mod: S$GLB,,, | Performed by: SURGERY

## 2020-05-19 NOTE — LETTER
May 19, 2020      Hardy Farrar MD  [address] [facility] [place] [address]           [facility] Surgery  [address] [facility] [address]  Phone: [phone]  Fax: [phone]          Patient: Jennifer Becerra   MR Number: [id number]   YOB: 1945   Date of Visit: 5/19/2020       Dear Dr. Hardy Farrar:    Thank you for referring Jennifer Becerra to me for evaluation. Attached you will find relevant portions of my assessment and plan of care.    If you have questions, please do not hesitate to call me. I look forward to following Jennifer Becerra along with you.    Sincerely,    Denzel Teixeira MD    Enclosure  CC:  No Recipients    If you would like to receive this communication electronically, please contact [email or web address] or [phone] to request more information on [employer] Link access.    For providers and/or their staff who would like to refer a patient to [facility], please contact us through our one-stop-shop provider referral line, [facility], at [phone].    If you feel you have received this communication in error or would no longer like to receive these types of communications, please e-mail [email or web address]

## 2020-05-19 NOTE — PROGRESS NOTES
Jennifer is 74-year-old  female patient who is being seen at the request of her provider, Dr. sorenson for the evaluation of known abnormal CT scan findings regarding the gallbladder.  According to the patient, the patient has had right upper quadrant abdominal pain associated with severe vomiting episodes back in early May.  She was brought to the emergency room and had an evaluation done which confirmed presence of acute pancreatitis, likely due to gallstones.  The subsequent CT scan confirmed presence of multiple findings consistent with gallstones as well as calcified gallbladder.  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  The CT scan that was done more than 2 weeks ago.  She had recent blood test which was done 5 days ago confirms resolution of acute phase of cholecystitis and pancreatitis.  According to the patient, she is able to tolerate some p.o. intake but not solids.  She is found to be free of pain.  Abdominal exam is essentially unremarkable without any tenderness on the right upper quadrant in the abdomen.  After carefully reviewing all CT scan, ultrasound images as well as blood test, it is likely that the patient had a gallstone pancreatitis due to cholelithiasis.  The recommendation at this time is to proceed with robotic assisted cholecystectomy.  The risks are present but minimal.  There is a possibility of finding gallbladder cancer in the porcelain gallbladder which can only be determined once the cholecystectomy is completed.  These are discussed with her daughter who came with the patient.  Patient has dementia and is unable to make her own decision at this time.  The surgery is recommended as early as possible but the family has decided to discuss later.  Total time approximately half an hour was spent with this patient, and more than 50% of that was spent for treatment planning and counseling.    Denzel Teixeira

## 2020-05-25 PROBLEM — I10 ESSENTIAL HYPERTENSION, BENIGN: Status: ACTIVE | Noted: 2020-05-25

## 2021-11-22 ENCOUNTER — EXTERNAL HOME HEALTH (OUTPATIENT)
Dept: HOME HEALTH SERVICES | Facility: HOSPITAL | Age: 76
End: 2021-11-22
Payer: MEDICARE

## 2022-09-26 ENCOUNTER — TELEPHONE (OUTPATIENT)
Dept: PSYCHIATRY | Facility: CLINIC | Age: 77
End: 2022-09-26
Payer: MEDICARE

## 2022-12-06 ENCOUNTER — TELEPHONE (OUTPATIENT)
Dept: PSYCHIATRY | Facility: CLINIC | Age: 77
End: 2022-12-06
Payer: MEDICARE

## 2022-12-06 NOTE — TELEPHONE ENCOUNTER
----- Message from Hardy Muhammad sent at 12/6/2022  9:39 AM CST -----  Contact: Jennifer De La Rosa/Daughter called to schedule an appointment for her mom Jennifer. She has left messages to the [facility] several times but no response. Please callback at [phone].

## 2023-07-21 ENCOUNTER — PATIENT MESSAGE (OUTPATIENT)
Dept: PSYCHIATRY | Facility: CLINIC | Age: 78
End: 2023-07-21
Payer: MEDICARE

## 2024-10-07 ENCOUNTER — HOSPITAL ENCOUNTER (EMERGENCY)
Facility: HOSPITAL | Age: 79
Discharge: HOME | End: 2024-10-07
Attending: EMERGENCY MEDICINE
Payer: MEDICARE

## 2024-10-07 VITALS
DIASTOLIC BLOOD PRESSURE: 82 MMHG | BODY MASS INDEX: 29.75 KG/M2 | TEMPERATURE: 98 F | RESPIRATION RATE: 19 BRPM | HEART RATE: 57 BPM | SYSTOLIC BLOOD PRESSURE: 177 MMHG | HEIGHT: 65 IN | WEIGHT: 178.56 LBS | OXYGEN SATURATION: 100 %

## 2024-10-07 DIAGNOSIS — R29.818 ACUTE FOCAL NEUROLOGICAL DEFICIT: Primary | ICD-10-CM

## 2024-10-07 DIAGNOSIS — R93.0 ABNORMAL CT OF THE HEAD: ICD-10-CM

## 2024-10-07 DIAGNOSIS — R47.9 SPEECH DISTURBANCE, UNSPECIFIED TYPE: ICD-10-CM

## 2024-10-07 DIAGNOSIS — R53.1 RIGHT SIDED WEAKNESS: ICD-10-CM

## 2024-10-07 LAB
ALBUMIN SERPL BCP-MCNC: 3.8 G/DL (ref 3.5–5.2)
ALP SERPL-CCNC: 79 U/L (ref 55–135)
ALT SERPL W/O P-5'-P-CCNC: 20 U/L (ref 10–44)
ANION GAP SERPL CALC-SCNC: 13 MMOL/L (ref 8–16)
AST SERPL-CCNC: 28 U/L (ref 10–40)
BASOPHILS # BLD AUTO: 0.02 K/UL (ref 0–0.2)
BASOPHILS NFR BLD: 0.3 % (ref 0–1.9)
BILIRUB SERPL-MCNC: 0.3 MG/DL (ref 0.1–1)
BUN SERPL-MCNC: 8 MG/DL (ref 8–23)
CALCIUM SERPL-MCNC: 9.8 MG/DL (ref 8.7–10.5)
CHLORIDE SERPL-SCNC: 106 MMOL/L (ref 95–110)
CHOLEST SERPL-MCNC: 151 MG/DL (ref 120–199)
CHOLEST/HDLC SERPL: 3.4 {RATIO} (ref 2–5)
CO2 SERPL-SCNC: 25 MMOL/L (ref 23–29)
CREAT SERPL-MCNC: 0.9 MG/DL (ref 0.5–1.4)
DIFFERENTIAL METHOD BLD: ABNORMAL
EOSINOPHIL # BLD AUTO: 0.1 K/UL (ref 0–0.5)
EOSINOPHIL NFR BLD: 0.8 % (ref 0–8)
ERYTHROCYTE [DISTWIDTH] IN BLOOD BY AUTOMATED COUNT: 13.3 % (ref 11.5–14.5)
EST. GFR  (NO RACE VARIABLE): >60 ML/MIN/1.73 M^2
GLUCOSE SERPL-MCNC: 127 MG/DL (ref 70–110)
HCT VFR BLD AUTO: 40.4 % (ref 37–48.5)
HDLC SERPL-MCNC: 45 MG/DL (ref 40–75)
HDLC SERPL: 29.8 % (ref 20–50)
HGB BLD-MCNC: 12.8 G/DL (ref 12–16)
IMM GRANULOCYTES # BLD AUTO: 0.03 K/UL (ref 0–0.04)
IMM GRANULOCYTES NFR BLD AUTO: 0.4 % (ref 0–0.5)
INR PPP: 0.9 (ref 0.8–1.2)
LDLC SERPL CALC-MCNC: 80 MG/DL (ref 63–159)
LYMPHOCYTES # BLD AUTO: 1.8 K/UL (ref 1–4.8)
LYMPHOCYTES NFR BLD: 22.9 % (ref 18–48)
MCH RBC QN AUTO: 28.3 PG (ref 27–31)
MCHC RBC AUTO-ENTMCNC: 31.7 G/DL (ref 32–36)
MCV RBC AUTO: 89 FL (ref 82–98)
MONOCYTES # BLD AUTO: 0.4 K/UL (ref 0.3–1)
MONOCYTES NFR BLD: 4.6 % (ref 4–15)
NEUTROPHILS # BLD AUTO: 5.4 K/UL (ref 1.8–7.7)
NEUTROPHILS NFR BLD: 71 % (ref 38–73)
NONHDLC SERPL-MCNC: 106 MG/DL
NRBC BLD-RTO: 0 /100 WBC
PLATELET # BLD AUTO: 225 K/UL (ref 150–450)
PMV BLD AUTO: 11.5 FL (ref 9.2–12.9)
POTASSIUM SERPL-SCNC: 4.1 MMOL/L (ref 3.5–5.1)
PROT SERPL-MCNC: 7.6 G/DL (ref 6–8.4)
PROTHROMBIN TIME: 10.7 SEC (ref 9–12.5)
RBC # BLD AUTO: 4.52 M/UL (ref 4–5.4)
SODIUM SERPL-SCNC: 144 MMOL/L (ref 136–145)
TRIGL SERPL-MCNC: 130 MG/DL (ref 30–150)
TSH SERPL DL<=0.005 MIU/L-ACNC: 3.22 UIU/ML (ref 0.4–4)
WBC # BLD AUTO: 7.65 K/UL (ref 3.9–12.7)

## 2024-10-07 PROCEDURE — 80061 LIPID PANEL: CPT | Performed by: EMERGENCY MEDICINE

## 2024-10-07 PROCEDURE — 99285 EMERGENCY DEPT VISIT HI MDM: CPT | Mod: 25,ER

## 2024-10-07 PROCEDURE — 80053 COMPREHEN METABOLIC PANEL: CPT | Mod: ER | Performed by: EMERGENCY MEDICINE

## 2024-10-07 PROCEDURE — 94761 N-INVAS EAR/PLS OXIMETRY MLT: CPT | Mod: ER

## 2024-10-07 PROCEDURE — 99447 NTRPROF PH1/NTRNET/EHR 11-20: CPT | Mod: ,,, | Performed by: PSYCHIATRY & NEUROLOGY

## 2024-10-07 PROCEDURE — 93005 ELECTROCARDIOGRAM TRACING: CPT | Mod: ER

## 2024-10-07 PROCEDURE — 85025 COMPLETE CBC W/AUTO DIFF WBC: CPT | Mod: ER | Performed by: EMERGENCY MEDICINE

## 2024-10-07 PROCEDURE — 84443 ASSAY THYROID STIM HORMONE: CPT | Mod: ER | Performed by: EMERGENCY MEDICINE

## 2024-10-07 PROCEDURE — 93010 ELECTROCARDIOGRAM REPORT: CPT | Mod: ,,, | Performed by: STUDENT IN AN ORGANIZED HEALTH CARE EDUCATION/TRAINING PROGRAM

## 2024-10-07 PROCEDURE — 85610 PROTHROMBIN TIME: CPT | Mod: ER | Performed by: EMERGENCY MEDICINE

## 2024-10-07 PROCEDURE — 96374 THER/PROPH/DIAG INJ IV PUSH: CPT | Mod: ER

## 2024-10-07 PROCEDURE — 63600175 PHARM REV CODE 636 W HCPCS: Mod: ER | Performed by: EMERGENCY MEDICINE

## 2024-10-07 RX ORDER — DEXAMETHASONE SODIUM PHOSPHATE 4 MG/ML
8 INJECTION, SOLUTION INTRA-ARTICULAR; INTRALESIONAL; INTRAMUSCULAR; INTRAVENOUS; SOFT TISSUE
Status: COMPLETED | OUTPATIENT
Start: 2024-10-07 | End: 2024-10-07

## 2024-10-07 RX ADMIN — DEXAMETHASONE SODIUM PHOSPHATE 8 MG: 4 INJECTION INTRA-ARTICULAR; INTRALESIONAL; INTRAMUSCULAR; INTRAVENOUS; SOFT TISSUE at 01:10

## 2024-10-07 NOTE — ED PROVIDER NOTES
Encounter Date: 10/7/2024       History     Chief Complaint   Patient presents with    Aphasia     Pt comes in POV with daughter who states she has had increased confusion x2 days with aphasia that began tonight. Hx of dementia.      78 y/o F with PMH of dementia, HTN, Hypothyroidism here with AMS. Her last known normal was   Was riding with daughter to [employer] today, and then on the way back she was more sleepy. On arrival home, she was over 24 hours ago, on Saturday night. Yesterday, she appeared more sleepy to her daughter throughout the day and was not her normal self. The daughter was going to [employer], and took the patient with her. When they were loading her into the car, they noticed she was not her usual self as well, and was not fussing like she normally does. The whole ride to [employer] she was sleepy as well, so they brought her here for further evaluation. They note her speech is not as comprehensible as it usually is. History is limited as patient is unable to provide any history.     The history is provided by a relative and a caregiver. The history is limited by the condition of the patient.     Review of patient's allergies indicates:  No Known Allergies  Past Medical History:   Diagnosis Date    Dementia     Diabetes     Hyperlipidemia     Hypertension     Hypothyroidism      Past Surgical History:   Procedure Laterality Date    laporoscopic cholecystectomy  07/2020     No family history on file.  Social History     Tobacco Use    Smoking status: Former    Smokeless tobacco: Never   Substance Use Topics    Alcohol use: Not Currently    Drug use: Never     Review of Systems   Unable to perform ROS: Dementia   Constitutional:  Positive for fatigue.   Neurological:  Positive for speech difficulty.       Physical Exam     Initial Vitals   BP Pulse Resp Temp SpO2   10/07/24 0020 10/07/24 0020 10/07/24 0020 10/07/24 0237 10/07/24 0020   (!) 158/73 60 15 98.2 °F (36.8 °C) 98 %      MAP       --                 Physical Exam    Constitutional: She appears well-developed and well-nourished.   HENT:   Head: Normocephalic and atraumatic. Mouth/Throat: Oropharynx is clear and moist.   Eyes: EOM are normal. Pupils are equal, round, and reactive to light.   Neck: Neck supple.   Normal range of motion.  Cardiovascular:  Regular rhythm.           Bradycardia   Pulmonary/Chest: Breath sounds normal. No respiratory distress.   Abdominal: She exhibits no distension. There is no abdominal tenderness.   Musculoskeletal:      Cervical back: Normal range of motion and neck supple.     Neurological: She is alert.   Unable to answer orientation questions. Speech is slurred and mostly incomprehensible. She is seen to move all 4 extremities, but she does appear to move the right side less. She does not participate in exam, and is not following questions. No facial droop. She does react to touch in all 4 extremities.    Skin: Skin is warm and dry.         ED Course   Critical Care    Date/Time: 10/7/2024 4:37 AM    Performed by: Thomas Cloud MD  Authorized by: Thomas Cloud MD  Direct patient critical care time: 7 minutes  Additional history critical care time: 8 minutes  Ordering / reviewing critical care time: 6 minutes  Documentation critical care time: 5 minutes  Consulting other physicians critical care time: 6 minutes  Other critical care time: 7 minutes  Total critical care time (exclusive of procedural time) : 39 minutes  Critical care time was exclusive of separately billable procedures and treating other patients and teaching time.  Critical care was necessary to treat or prevent imminent or life-threatening deterioration of the following conditions: CNS failure or compromise.  Critical care was time spent personally by me on the following activities: blood draw for specimens, development of treatment plan with patient or surrogate, discussions with consultants, interpretation of cardiac output measurements, evaluation  of patient's response to treatment, examination of patient, obtaining history from patient or surrogate, ordering and performing treatments and interventions, ordering and review of laboratory studies, ordering and review of radiographic studies, re-evaluation of patient's condition, review of old charts and pulse oximetry.        Labs Reviewed   CBC W/ AUTO DIFFERENTIAL - Abnormal       Result Value    WBC 7.65      RBC 4.52      Hemoglobin 12.8      Hematocrit 40.4      MCV 89      MCH 28.3      MCHC 31.7 (*)     RDW 13.3      Platelets 225      MPV 11.5      Immature Granulocytes 0.4      Gran # (ANC) 5.4      Immature Grans (Abs) 0.03      Lymph # 1.8      Mono # 0.4      Eos # 0.1      Baso # 0.02      nRBC 0      Gran % 71.0      Lymph % 22.9      Mono % 4.6      Eosinophil % 0.8      Basophil % 0.3      Differential Method Automated     COMPREHENSIVE METABOLIC PANEL - Abnormal    Sodium 144      Potassium 4.1      Chloride 106      CO2 25      Glucose 127 (*)     BUN 8      Creatinine 0.9      Calcium 9.8      Total Protein 7.6      Albumin 3.8      Total Bilirubin 0.3      Alkaline Phosphatase 79      AST 28      ALT 20      eGFR >60.0      Anion Gap 13     PROTIME-INR    Prothrombin Time 10.7      INR 0.9     TSH    TSH 3.220     LIPID PANEL   URINALYSIS, REFLEX TO URINE CULTURE   POCT GLUCOSE, HAND-HELD DEVICE          Imaging Results               CT Head Without Contrast (Final result)  Result time 10/07/24 01:13:24      Final result by Raegan Sanchez MD (10/07/24 01:13:24)                   Impression:      1. Calcified extra-axial lesion along the planum sphenoidale (asymmetric to the left) suggestive of planum sphenoidale meningioma.  Mass effect on the left frontal lobe with associated vasogenic edema and mass effect on the frontal horn of the lateral ventricle.  Findings appear to coincide with those described on imaging report for outside CT and MRI as referenced above.  2. Hypoattenuation  involving the left caudate and lentiform nucleus which could reflect sequela of prior infarct or aforementioned vasogenic edema although if there is high clinical concern for acute ischemia, further assessment with MRI is advised if there are no clinical contraindications to exclude region of age-indeterminate infarct.  3. Generalized cerebral volume loss and findings of chronic microvascular ischemic change.      Electronically signed by: Raegan Sanchez MD  Date:    10/07/2024  Time:    01:13               Narrative:    EXAMINATION:  CT HEAD WITHOUT CONTRAST    CLINICAL HISTORY:  Neuro deficit, acute, stroke suspected;    TECHNIQUE:  Low dose axial images were obtained through the head.  Coronal and sagittal reformations were also performed. Contrast was not administered.    COMPARISON:  Correlation is made to dictated report for outside head CT head CT performed at outside facility dated 09/18/2022 and MRI 01/18/2022.    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  This measures approximately 2.7 x 2.2 x 1.9 cm (previously 2.9 x 2.6 x 2.3 cm per most recent outside MRI report.  There is hypoattenuation throughout the left frontal in keeping with associated vasogenic edema noting slight associated mass effect on the frontal horn of the left lateral ventricle.  No evidence of overt hydrocephalus at this time.    There is a more focal hypoattenuation within the region of the left caudate and lentiform nucleus.  This could relate to sequela of prior infarct and/or aforementioned edema although if there is clinical concern for acute ischemia, further assessment with MRI is advised if there are no clinical contraindications noting definitive assessment of the chronicity of this finding is limited without imaging exams available for direct comparison at this time.  There is  periventricular white matter hypoattenuation, nonspecific but likely reflecting sequela of chronic microvascular ischemic change.  There is also a small focus of hypoattenuation in the right chaya which could reflect sequela of chronic microvascular cement change or remote lacunar type infarct.    Basal cisterns are patent.  The visualized paranasal sinuses and mastoid air cells are clear of acute process.  The visualized bones of the calvarium demonstrate no acute osseous abnormality.    This report was flagged in Epic as abnormal.                                       Medications   dexAMETHasone injection 8 mg (8 mg Intravenous Given 10/7/24 0148)     Medical Decision Making  Amount and/or Complexity of Data Reviewed  Labs: ordered.  Radiology: ordered.    Risk  Prescription drug management.               ED Course as of 10/07/24 0437   Mon Oct 07, 2024   0035 Patient is not a candidate for thrombolytics or clot directed therapy given how far out from her last known normal she is (about 26.5 hours).  [BA]   0140 I discussed the case with Dr. Sosa with Vascular Neurology. Meningioma can explain everything although MRI will be needed to exclude concomitant stroke. He asked we involve neurosurgery. [BA]   0430 All historical, clinical, radiographic, and laboratory findings were reviewed with the patient/family in detail along with the indications for transport to [facility] order to receive NSGY evaluation. We are unable to transport patient to [facility] due to no NSGY services, and evaluation at her preferred facility.  All remaining questions and concerns were addressed at this time and the patient/family communicates understanding and agrees to proceed accordingly.  Similarly, all pertinent details of the encounter were discussed with Dr. Lopez via [facility] facilitated communication and they agree to receive the patient for further care as outlined above.  The patient will be transferred by [facility] ambulance  services secondary to a need for ongoing telemetry monitoring en route.  Thomas Cloud MD  4:31 AM         [BA]      ED Course User Index  [BA] Thomas Cloud MD                           Clinical Impression:  Final diagnoses:  [R29.818] Acute focal neurological deficit (Primary)  [R47.9] Speech disturbance, unspecified type  [R53.1] Right sided weakness  [R93.0] Abnormal CT of the head          ED Disposition Condition    Transfer to Another Facility Stable                Thomas Cloud MD  10/07/24 0432

## 2024-10-07 NOTE — TELEMEDICINE CONSULT
[facility]  Vascular Neurology  Comprehensive Stroke Center  TeleVascular Neurology Interprofessional Consult Note           Consult Information  Consults    Consulting Provider: ROXANA ARMSTRONG   Patient Location:  [facility] EMERGENCY DEPARTMENT     Summary of patient's symptoms:  Sleepy since awakening 10/5/24 with slurred speech several hours ago.  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       Images personally reviewed and interpreted:  Study: Head CT  Study Interpretation: see above     Assessment and plan:  Meningioma:  MRI brain will help exclude concomitant ischemic stroke.  Rec transfer to facility with neurosurgery.     I spent approximately 11 minutes on this encounter. More than half of that time was spent communicating with the consulting provider and coordinating patient care.       Fabian Sosa MD        This encounter was conducted as an interprofessional communication between providers at the [facility] and vascular neurologist. The interaction was completed over the phone or via secure messaging (electronic medical record - [facility] Secure Chat).     Once this note was completed, a written copy was sent back to the provider via fax or electronic medical record.

## 2024-10-08 LAB
OHS QRS DURATION: 80 MS
OHS QTC CALCULATION: 459 MS